# Patient Record
Sex: FEMALE | Race: WHITE
[De-identification: names, ages, dates, MRNs, and addresses within clinical notes are randomized per-mention and may not be internally consistent; named-entity substitution may affect disease eponyms.]

---

## 2021-06-30 ENCOUNTER — HOSPITAL ENCOUNTER (OUTPATIENT)
Dept: HOSPITAL 47 - RADXRMAIN | Age: 36
Discharge: HOME | End: 2021-06-30
Attending: ORTHOPAEDIC SURGERY
Payer: MEDICAID

## 2021-06-30 DIAGNOSIS — S42.021K: Primary | ICD-10-CM

## 2021-06-30 DIAGNOSIS — Z98.890: ICD-10-CM

## 2021-07-01 NOTE — XR
EXAMINATION TYPE: XR clavicle RT

 

DATE OF EXAM: 6/30/2021

 

COMPARISON: NONE

 

HISTORY: Pain

 

TECHNIQUE: One view submitted

 

FINDINGS: Postsurgical change involving the right clavicle with evidence of previous fracture. There 
is a bony fragment inferior to the fracture line and there remains a lucency with nonunion fracture l
ine along the mid shaft oblique in orientation. Remaining visualized osseous structures intact. Visua
lized lung fields clear.

 

IMPRESSION: Surgical change with nonunion fracture.

## 2021-07-20 ENCOUNTER — HOSPITAL ENCOUNTER (OUTPATIENT)
Dept: HOSPITAL 47 - RADXRMAIN | Age: 36
Discharge: HOME | End: 2021-07-20
Attending: ORTHOPAEDIC SURGERY
Payer: MEDICAID

## 2021-07-20 DIAGNOSIS — S42.001A: Primary | ICD-10-CM

## 2021-07-20 NOTE — XR
Right clavicle

 

HISTORY: Clavicle fracture

 

Single frontal view of the right clavicle submitted and correlated to prior exam 6/30/2021

 

Findings are similar to prior exam. There is persistent lucency in the mid diaphyseal right clavicle.
 Multiple screws across the clavicle cortex as on prior. Alignment is maintained. Right lung apex as 
visualized is normal. Comminuted fragment shows no definite bony fusion.

 

IMPRESSION: Status post open reduction internal fixation with nonunion

## 2021-08-23 ENCOUNTER — HOSPITAL ENCOUNTER (OUTPATIENT)
Dept: HOSPITAL 47 - RADXRMAIN | Age: 36
Discharge: HOME | End: 2021-08-23
Attending: ORTHOPAEDIC SURGERY
Payer: MEDICAID

## 2021-08-23 ENCOUNTER — HOSPITAL ENCOUNTER (OUTPATIENT)
Dept: HOSPITAL 47 - RADCTMAIN | Age: 36
Discharge: HOME | End: 2021-08-23
Attending: INTERNAL MEDICINE
Payer: MEDICAID

## 2021-08-23 DIAGNOSIS — Z09: Primary | ICD-10-CM

## 2021-08-23 DIAGNOSIS — S22.42XA: Primary | ICD-10-CM

## 2021-08-23 PROCEDURE — 71250 CT THORAX DX C-: CPT

## 2021-08-23 NOTE — CT
EXAMINATION TYPE: CT chest wo con

 

DATE OF EXAM: 8/23/2021

 

COMPARISON: None

 

HISTORY: 36-year-old female J93.9, E66.9, Pneumothorax and right sided rib fractures from fall in Apr
il 2021.

 

TECHNIQUE: Contiguous axial scanning of the chest without IV contrast. Coronal and sagittal reconstru
ctions performed.

 

CT DLP: 667 mGycm

Automated exposure control for dose reduction was used.

 

 

FINDINGS: 

The heart is normal size without pericardial effusion.

 

Aorta normal caliber with conventional arch vessel branching anatomy.

 

Scattered nonenlarged mediastinal lymph nodes measuring up to 6 mm. No progressive lymphadenopathy by
 CT size criteria.

 

No consolidation, pneumothorax, or pleural effusion.

 

Visualized upper abdomen shows no gross abnormality.

 

Bones: Plate and screw fixation across the right clavicle.  Healed fracture deformities of the right 
lateral second through seventh ribs.   Some of these fractures have healed with mild displacement res
ulting in contour irregularity.   Some residual fracture lucency remains at the sixth and seventh rib
 fractures but healing appears nearly complete.

 

There is a right paracentral disc herniation at T11-T12 incidentally noted.

 

 

IMPRESSION: 

 

1. HEALED FRACTURE DEFORMITIES RIGHT LATERAL SECOND THROUGH SEVENTH RIBS. SOME OF THESE FRACTURES HAV
E HEALED IN MILD DISPLACEMENT RESULTING IN CONTOUR STEP-OFFS. THE SIXTH AND SEVENTH RIB FRACTURES MANI
W SLIGHT RESIDUAL LUCENCY SUGGESTING NEARLY HEALED FRACTURES.

2. NO PNEUMOTHORAX OR ACUTE PULMONARY PROCESS.

3. PARTIALLY VISUALIZED PLATE AND SCREW FIXATION ACROSS THE RIGHT CLAVICLE.

4. INCIDENTAL RIGHT PARACENTRAL DISC HERNIATION AT T11-T12.

## 2021-08-23 NOTE — XR
EXAMINATION TYPE: XR clavicle RT

 

DATE OF EXAM: 8/23/2021

 

COMPARISON: 7/20/2021

 

HISTORY: Follow-up

 

TECHNIQUE: One view submitted

 

FINDINGS: Findings are similar to prior exam. There is persistent lucency in the mid diaphyseal right
 clavicle. Multiple screws across the clavicle cortex as on prior. Alignment is maintained. Right radha
g apex as visualized is normal. Comminuted fragment shows no definite bony fusion. Chronic rib deform
ities are noted.

 

 

IMPRESSION: Stable postsurgical change with no significant interval change in the appearance of the c
lavicle. No significant callus formation.

## 2022-02-07 ENCOUNTER — HOSPITAL ENCOUNTER (OUTPATIENT)
Dept: HOSPITAL 47 - RADXRMAIN | Age: 37
Discharge: HOME | End: 2022-02-07
Attending: ORTHOPAEDIC SURGERY
Payer: MEDICAID
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
## 2022-02-07 DIAGNOSIS — S42.021S: Primary | ICD-10-CM

## 2022-02-07 NOTE — XR
EXAMINATION TYPE: XR clavicle RT

 

DATE OF EXAM: 2/7/2022

 

COMPARISON: 8/23/2021

 

HISTORY: Pain

 

TECHNIQUE: 1 view is submitted

 

FINDINGS: Postsurgical change overlying the right clavicle. There is reduced visualization of the rig
ht fracture relative to the prior exam.

 

IMPRESSION: Postoperative change.